# Patient Record
Sex: FEMALE | Race: WHITE | NOT HISPANIC OR LATINO | Employment: OTHER | URBAN - METROPOLITAN AREA
[De-identification: names, ages, dates, MRNs, and addresses within clinical notes are randomized per-mention and may not be internally consistent; named-entity substitution may affect disease eponyms.]

---

## 2019-09-13 ENCOUNTER — NEW PATIENT COMPREHENSIVE (OUTPATIENT)
Dept: URBAN - METROPOLITAN AREA CLINIC 38 | Facility: CLINIC | Age: 44
End: 2019-09-13

## 2019-09-13 DIAGNOSIS — H52.203: ICD-10-CM

## 2019-09-13 DIAGNOSIS — H52.02: ICD-10-CM

## 2019-09-13 DIAGNOSIS — H52.4: ICD-10-CM

## 2019-09-13 PROCEDURE — 92015 DETERMINE REFRACTIVE STATE: CPT

## 2019-09-13 PROCEDURE — 92004 COMPRE OPH EXAM NEW PT 1/>: CPT

## 2019-09-13 ASSESSMENT — TONOMETRY
OD_IOP_MMHG: 16
OS_IOP_MMHG: 17

## 2019-09-13 ASSESSMENT — VISUAL ACUITY
OD_CC: J1
OS_SC: 20/20-1
OD_SC: 20/20
OS_CC: J3

## 2020-12-08 ENCOUNTER — ESTABLISHED COMPREHENSIVE EXAM (OUTPATIENT)
Dept: URBAN - METROPOLITAN AREA CLINIC 38 | Facility: CLINIC | Age: 45
End: 2020-12-08

## 2020-12-08 DIAGNOSIS — Z83.511: ICD-10-CM

## 2020-12-08 DIAGNOSIS — H52.203: ICD-10-CM

## 2020-12-08 DIAGNOSIS — H52.02: ICD-10-CM

## 2020-12-08 DIAGNOSIS — H52.4: ICD-10-CM

## 2020-12-08 PROCEDURE — 92014 COMPRE OPH EXAM EST PT 1/>: CPT

## 2020-12-08 PROCEDURE — 92015 DETERMINE REFRACTIVE STATE: CPT

## 2020-12-08 ASSESSMENT — VISUAL ACUITY
OS_SC: J7
OD_CC: J2
OD_SC: J7
OD_SC: 20/20
OU_SC: J7
OS_CC: J2
OS_SC: 20/20-1
OU_SC: 20/20
OU_CC: J2

## 2020-12-08 ASSESSMENT — TONOMETRY
OS_IOP_MMHG: 16
OD_IOP_MMHG: 16

## 2021-10-28 ENCOUNTER — ESTABLISHED COMPREHENSIVE EXAM (OUTPATIENT)
Dept: URBAN - METROPOLITAN AREA CLINIC 38 | Facility: CLINIC | Age: 46
End: 2021-10-28

## 2021-10-28 DIAGNOSIS — H52.4: ICD-10-CM

## 2021-10-28 DIAGNOSIS — H52.02: ICD-10-CM

## 2021-10-28 DIAGNOSIS — H52.203: ICD-10-CM

## 2021-10-28 PROCEDURE — 92015 DETERMINE REFRACTIVE STATE: CPT

## 2021-10-28 PROCEDURE — 92014 COMPRE OPH EXAM EST PT 1/>: CPT

## 2021-10-28 ASSESSMENT — TONOMETRY
OS_IOP_MMHG: 16
OD_IOP_MMHG: 16

## 2021-10-28 ASSESSMENT — VISUAL ACUITY
OU_SC: 20/20
OD_CC: J1
OD_SC: 20/20
OS_SC: 20/20-1
OU_CC: J1
OS_CC: J1
OU_SC: J10

## 2022-11-28 ENCOUNTER — COMPREHENSIVE EXAM (OUTPATIENT)
Dept: URBAN - METROPOLITAN AREA CLINIC 38 | Facility: CLINIC | Age: 47
End: 2022-11-28

## 2022-11-28 DIAGNOSIS — H52.203: ICD-10-CM

## 2022-11-28 DIAGNOSIS — H52.4: ICD-10-CM

## 2022-11-28 DIAGNOSIS — H52.02: ICD-10-CM

## 2022-11-28 PROCEDURE — 92015 DETERMINE REFRACTIVE STATE: CPT

## 2022-11-28 PROCEDURE — 92014 COMPRE OPH EXAM EST PT 1/>: CPT

## 2022-11-28 RX ORDER — OXYMETAZOLINE HYDROCHLORIDE OPHTHALMIC 1 MG/ML: 1 SOLUTION/ DROPS OPHTHALMIC ONCE A DAY

## 2022-11-28 ASSESSMENT — VISUAL ACUITY
OU_CC: J1
OU_SC: 20/20
OD_SC: 20/20
OS_CC: J1-
OS_SC: 20/20
OD_CC: J1

## 2022-11-28 ASSESSMENT — TONOMETRY
OD_IOP_MMHG: 17
OS_IOP_MMHG: 15

## 2023-11-21 ENCOUNTER — COMPREHENSIVE EXAM (OUTPATIENT)
Dept: URBAN - METROPOLITAN AREA CLINIC 38 | Facility: CLINIC | Age: 48
End: 2023-11-21

## 2023-11-21 DIAGNOSIS — H52.203: ICD-10-CM

## 2023-11-21 DIAGNOSIS — H52.03: ICD-10-CM

## 2023-11-21 DIAGNOSIS — H52.4: ICD-10-CM

## 2023-11-21 PROCEDURE — 92015 DETERMINE REFRACTIVE STATE: CPT

## 2023-11-21 PROCEDURE — 92014 COMPRE OPH EXAM EST PT 1/>: CPT

## 2023-11-21 RX ORDER — BIMATOPROST 0.3 MG/ML
1 SOLUTION/ DROPS OPHTHALMIC ONCE A DAY
Start: 2023-11-21

## 2023-11-21 ASSESSMENT — VISUAL ACUITY
OS_SC: 20/20
OD_CC: J2-
OU_CC: J1
OD_SC: 20/25-1 FUZZY
OS_CC: J2
OU_SC: 20/20

## 2023-11-21 ASSESSMENT — TONOMETRY
OS_IOP_MMHG: 16
OD_IOP_MMHG: 18

## 2024-11-25 ENCOUNTER — COMPREHENSIVE EXAM (OUTPATIENT)
Dept: URBAN - METROPOLITAN AREA CLINIC 38 | Facility: CLINIC | Age: 49
End: 2024-11-25

## 2024-11-25 DIAGNOSIS — H52.03: ICD-10-CM

## 2024-11-25 DIAGNOSIS — H52.4: ICD-10-CM

## 2024-11-25 DIAGNOSIS — H52.203: ICD-10-CM

## 2024-11-25 PROCEDURE — 92015 DETERMINE REFRACTIVE STATE: CPT

## 2024-11-25 PROCEDURE — 92014 COMPRE OPH EXAM EST PT 1/>: CPT
